# Patient Record
Sex: FEMALE
[De-identification: names, ages, dates, MRNs, and addresses within clinical notes are randomized per-mention and may not be internally consistent; named-entity substitution may affect disease eponyms.]

---

## 2019-03-14 ENCOUNTER — HOSPITAL ENCOUNTER (OUTPATIENT)
Dept: HOSPITAL 14 - H.OPSURG | Age: 59
Discharge: HOME | End: 2019-03-14
Attending: ORTHOPAEDIC SURGERY
Payer: COMMERCIAL

## 2019-03-14 VITALS
SYSTOLIC BLOOD PRESSURE: 136 MMHG | DIASTOLIC BLOOD PRESSURE: 75 MMHG | OXYGEN SATURATION: 93 % | RESPIRATION RATE: 20 BRPM | HEART RATE: 73 BPM | TEMPERATURE: 97.7 F

## 2019-03-14 VITALS — BODY MASS INDEX: 25.7 KG/M2
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
## 2019-03-14 DIAGNOSIS — E78.5: ICD-10-CM

## 2019-03-14 DIAGNOSIS — S52.121A: Primary | ICD-10-CM

## 2019-03-14 DIAGNOSIS — M25.521: ICD-10-CM

## 2019-03-14 DIAGNOSIS — E03.9: ICD-10-CM

## 2019-03-14 PROCEDURE — 24300 MNPJ ELBOW UNDER ANES: CPT

## 2019-03-14 PROCEDURE — 24000 ARTHRT ELBW EXPL DRG/RMVL FB: CPT

## 2019-03-14 PROCEDURE — 24363 REPLACE ELBOW JOINT: CPT

## 2019-03-14 PROCEDURE — 88304 TISSUE EXAM BY PATHOLOGIST: CPT

## 2019-03-14 RX ADMIN — HYDROMORPHONE HYDROCHLORIDE PRN MG: 1 INJECTION, SOLUTION INTRAMUSCULAR; INTRAVENOUS; SUBCUTANEOUS at 17:30

## 2019-03-14 RX ADMIN — HYDROMORPHONE HYDROCHLORIDE PRN MG: 1 INJECTION, SOLUTION INTRAMUSCULAR; INTRAVENOUS; SUBCUTANEOUS at 16:35

## 2019-03-14 RX ADMIN — HYDROMORPHONE HYDROCHLORIDE PRN MG: 1 INJECTION, SOLUTION INTRAMUSCULAR; INTRAVENOUS; SUBCUTANEOUS at 16:46

## 2019-03-14 RX ADMIN — HYDROMORPHONE HYDROCHLORIDE PRN MG: 1 INJECTION, SOLUTION INTRAMUSCULAR; INTRAVENOUS; SUBCUTANEOUS at 16:55

## 2019-03-14 NOTE — CP.SDSHP
Same Day Surgery [facility] & P





- History


Proposed Procedure: Right elbow radial head replacement


Pre-Op Diagnosis: Right elbow radial head fracture





- Previous Medical/Surgical History


Misc: Other (HLD, hypothyroidism)


Previous Surgical History: tubal ligation





- Allergies


Allergies: 


Allergies





No Known Allergies Allergy (Verified 03/14/19 06:33)


   











- Current Medications


Current Medications: 


simvastatin, levothyroxine





- Physical Exam


General Appearance: NAD


Vital Signs: 


                                   Vital Signs











  03/14/19 03/14/19





  06:38 06:47


 


Temperature 97.7 F 


 


Pulse Rate 67 67


 


Respiratory 18 





Rate  


 


Blood Pressure 137/77 


 


O2 Sat by Pulse 99 





Oximetry  











Mental Status: Alert & Oriented x3


Neuro: WNL


Heart: WNL


Lungs: WNL


GI: WNL





- {Optional Preform as Required}


Abdomen: WNL


Integument: WNL


Ortho: Other (RUE: long arm splint intact, motor and sensation intact MN/UN/RN, 

2 sec cap refill all fingers)





- Impression


Impression: Patient is a 59 y/o female with a traumatic R elbow injury following

a fall on 3/7/19.  She presents for R elbow radial head replacement.  

RIsks/benefits/alternatives were explained to patient who understands and agrees

to proceed with above procedure.  [facility] interpertation #[id number] was used.


Pt. Evaluated Today:Candidate for Anesthesia & Procedure: Yes





- Date & Time


Date: 03/14/19


Time: 07:20





Short Stay Discharge





- Short Stay Discharge


Admitting Diagnosis/Reason for Visit: S52.121A/M25.521


Disposition: HOME/ ROUTINE

## 2019-03-14 NOTE — PCM.ANESB4
Infraclavicular Block





- Femoral Nerve Block


Date of Procedure: 03/14/19


Anesthesiologist: Reza Green


Pre-Procedure Diagnosis: R radial head fracture


Post-Procedure Diagnosis: same


Procedure Performed: Brachial Plexus at the Infraclavicular area Right





- Procedure


Infraclavicular Block: 


The procedure was explained to the patient that it is for the post-operative 

pain management. Consent was obtained after a thorough discussion with the 

patient regarding the benefits and possible complications of local anesthetic 

block of the brachial plexus at the supraclavicular area. The patient was 

brought to the operating room and standard monitors were applied. Time-out was 

held with the circulating nurse to confirm the correct surgery and the 

appropriate block. After completion of the surgery under general anesthesia, 

patient's head was gently rotated away from the operative __RIGHT___ shoulder 

and the area medial to the coracoid process and inferior to the clavicle was 

carefully palpated. The ultrasound transducer was then applied to the skin in 

the transverse plane and the brachial plexus was visualized surrounding the 

axillary artery and deep to the pectoralis major and minor muscles. After 

thorough identification, this area was prepped with Chloraprep solution three 

times.





At this point, a #21 gauge Stimuplex 4-inch needle was inserted cephalad to the 

ultrasound transducer and inferior to the clavicle in-plane towards the 

posterior aspect of the axillary artery. Needle advancement was performed 

carefully under ultrasound visualization. After repeated negative aspiration, 

___5__cc of _0.5____% ____ropivacaine_____was  injected and this was followed 

with __25____ cc of __0.5_____ % ___ropivacaine___. Under ultrasound guidance 

the local anesthetics were observed surrounding the cords of the brachial 

plexus. The needle was removed intact and sterile dressing was applied. The leighton

ent had stable vital signs and was subsequently extubated in no apparent 

distress.





The patient tolerated the supraclavicular block of the brachial plexus well with

stable vital signs was recovered in the post anesthesia recovery unit.

## 2019-03-16 NOTE — PCM.SURG1
Surgeon's Initial Post Op Note





- Surgeon's Notes


Surgeon: Flori


Assistant: ANNETTE Ram/ Mj Blanco pa-c


Type of Anesthesia: General Endo


Anesthesia Administered By: DR Reza Green


Pre-Operative Diagnosis: Displaced comminuted radial head fracture.  fracture 

subluxation/fracture dislocation R elbow


Operative Findings: displaced/ comminuted radial headf fracture R elbow.  elbow 

instability s/p elbow subluxation/dislocation


Post-Operative Diagnosis: as above


Operation Performed: Radial head replacement R elbow.  repair radial collateral 

ligam,ent.  evaluation maniupulation r elbow under anaesthesia.  applx posterior

splint.  positiong of fluoro/interpetation of video images


Specimen/Specimens Removed: radial head


Estimated Blood Loss: EBL {In ML}: 15


Blood Products Given: N/A


Drains Used: No Drains


Post-Op Condition: Fair


Date of Surgery/Procedure: 03/16/19


Time of Surgery/Procedure: 14:25 (time in romm 1326)

## 2019-03-16 NOTE — OP
PROCEDURE DATE:  03/14/2019



LOCATION:  [facility].



PREOPERATIVE DIAGNOSES:

1.  Status post fracture subluxation of the right elbow.

2.  Displaced comminuted radial head fracture.



OPERATIVE FINDINGS:

1.  Displaced and comminuted radial head fracture, right elbow.

2.  Instability, status post right elbow trauma and right elbow

subluxation/dislocation.



POSTOPERATIVE DIAGNOSES:

1.  Status post fracture subluxation of the right elbow.

2.  Displaced comminuted radial head fracture.



OPERATIONS PERFORMED:

1.  Radial head replacement, right elbow.

2.  Repair of radial collateral ligament.

3.  Right elbow arthrotomy.

4.  Manipulation and evaluation of the elbow under anesthesia.

5.  Application of posterior splint.

6.  Positioning for interpretation of video images.



SURGEON:  Eric Ruby MD



FIRST ASSISTANT:  BEBE Oquendo, certified registered nursing first

assistant.



SECOND ASSISTANT:  Mj Donovan PA-C



ESTIMATED BLOOD LOSS:  15 mL.



BLOOD PRODUCTS GIVEN:  None.



DRAINS:  No drains.



POSTOPERATIVE CONDITION:  Stable.



TIME OF SURGERY:  14:25



INCISION TIME IN THE ROOM:  13:26



OPERATIVE INDICATION:  Tiffany Gaxiola  is a 58-year-old woman, who was

admitted through the emergency room at [facility]. 

The patient presented with a trauma to the elbow with a fracture

subluxation/fracture dislocation with a comminuted displaced radial head

fracture.  The patient is seen in my office.  Pros, cons, risks and

benefits of open reduction and radial head replacement were discussed. 

Possibility of mechanical failure, infection, thromboembolic disease,

possibility of secondary or tertiary surgery was discussed.  The patient

understands the pros, cons, risks and benefits.  The informed consent was

obtained in the office through a culturally competent , Kia,

in attendance as well as in the Operating Room with the culturally

competent , as well as at the Same Day Surgery with the

culturally competent  by the video translation apparatus.



After having obtained informed consent in the above fashion; after

thoroughly discussing the pros, cons, risks and benefits of surgical

approach; the concept of radial head replacement and ligament repair; the

possibility of mechanical failure; infection; thromboembolic disease;

recurrent instability was discussed.



OPERATIVE PROCEDURE:  After having obtained informed consent, after having

identified side, site and procedure and a critical pause/time-out, after

the satisfactory induction of the anesthetic, with the patient identified

as Tiffany Gaxiola and having been placed in the supine position and all bony

prominences well padded, the right upper extremity was prepped and free

draped in the usual fashion for upper extremity surgery.  The tourniquet

had been applied, but was not yet inflated.  After exsanguinating the limb

using 4-inch Esmarch bandage, the tourniquet, which had been applied was

inflated to 250 mmHg.  This having been accomplished under the surgeon's

direction, the fluoroscope was positioned.  Video images were generated. 

Therapeutic decisions were made therefrom.  An incision was described on

the lateral aspect of the distal humerus superficial to the radial head. 

The skin incision was carried down through the skin and subcutaneous

tissue.  The patient was found to have injury to the lateral collateral

ligament.  Dissection was carried out from the lateral ridge of the humerus

using #15 blade, taking great care to avoid injury to the radial nerve. 

This having been accomplished, the ligament was identified.  Arthrotomy at

this point in time was accomplished.  Great care was taken to keep the

forearm in pronation to avoid injury to the posterior interosseous branch

of the radial nerve.  The radial head was found to be fragmented, severely

comminuted and displaced.  Taking great care with minimal dissection to

expose the fracture site, the radial head was removed as well as the

fragments.  This was irrigated.  At this point in time with the Black

retractors being employed, the osteotomy site was identified and using the

oscillating saw, the radial head osteotomy was accomplished.  This having

been accomplished, the joint was thoroughly irrigated and it was found that

the elbow was somewhat unstable.  This having been accomplished, with the

arm in pronation and delivering the proximal radius into the wound, reaming

was accomplished to the appropriate sized radial head.  The radial head

circumference was measured and please refer to the video photographs and

the package inserts for the sizing.  Trialing having been accomplished, the

elbow was reduced and found to be stable on pronation, supination,

flexion/extension.  At this point in time, the radial head replacement was

introduced press fit.  The fit was found to be excellent.  The elbow was

replaced.  At this point in time, using interrupted FiberWire suture,

repair of the lateral collateral ligament was accomplished primarily.  The

elbow was manipulated under anesthesia and fluoroscopy and found to be

stable.  At this point in time, the wound was thoroughly irrigated. 

Arthrotomy having been accomplished and repair of the radial collateral

ligament having been accomplished and successful replacement of the radial

head having been accomplished, the wound was thoroughly irrigated.  Closure

was in layers.  The capsule was closed with interrupted FiberWire followed

by Vicryl, 0 Vicryl, 2-0 Vicryl and staples for skin.  The [facility]

compression dressing and posterior splint was applied.  The elbow was

reduced at the time of the splinting.  Posterior splint was applied and

verification of position was offered on image intensification views.  The

patient was transferred from the operating table to a stretcher, having

tolerated the procedure well.



It was critical that the first assistant and the second assistant were

present for this procedure to afford exposure at each step of the

procedure.





__________________________________________

Eric Ruby MD





DD:  03/16/2019 10:32:33

DT:  03/16/2019 10:36:38

Job # [id number]

## 2019-03-18 NOTE — RAD
Date of service: 



03/14/2019



PROCEDURE:  Fluoroscopy up to 1 hr.



HISTORY:

Open reduction internal fixation radial head fracture. 



COMPARISON:

None



TECHNIQUE:

Standard protocol for this study/examination.



FINDINGS:

 Total fluoroscopic time (continuous mode) utilized during the 

procedure 17.0 seconds. 



Total exam DLP: 0.39 (mGy).



IMPRESSION:

Submitted images from the current procedure: 9.0.